# Patient Record
Sex: MALE | URBAN - METROPOLITAN AREA
[De-identification: names, ages, dates, MRNs, and addresses within clinical notes are randomized per-mention and may not be internally consistent; named-entity substitution may affect disease eponyms.]

---

## 2018-02-01 NOTE — PATIENT DISCUSSION
Based on today’s exam, diagnostic studies, and review of records, the determination was made for FOCAL LASER treatment today of the macular edema. Procedure was performed without complications. Instructed to call immediately if any new distortion, blurring, decreased vision or eye pain.

## 2019-06-06 NOTE — PATIENT DISCUSSION
Capsular haze appears visually significant, RECOMMEND CONSULT with DR William Washburn for possible YAG capsulotomy.

## 2019-06-06 NOTE — PROCEDURE NOTE: CLINICAL
PROCEDURE NOTE: Focal Laser, Retina OD. Diagnosis: Diabetic Macular Edema. Anesthesia: Topical. Prior to focal laser, risks/benefits/alternatives to laser discussed including loss of vision and patient wished to proceed. An informed consent was obtained and no assurances or guarantees were given. Spot size: 50 um. Power: 50 mW. Number of pulses: 7. Pulse duration: 50 ms. Procedure Time: 759. Patient tolerated procedure well. There were no complications. Post procedure instructions given. Patient given office phone number/answering service number and advised to call immediately should there be loss of vision or pain, or should they have any other questions or concerns. [facility]

## 2019-08-28 ENCOUNTER — IMPORTED ENCOUNTER (OUTPATIENT)
Dept: URBAN - METROPOLITAN AREA CLINIC 9 | Facility: CLINIC | Age: 29
End: 2019-08-28

## 2020-10-07 NOTE — PATIENT DISCUSSION
Capsular haze appears visually significant, RECOMMEND CONSULT with DR Delta Rice for possible YAG capsulotomy.

## 2020-10-07 NOTE — PROCEDURE NOTE: CLINICAL
PROCEDURE NOTE: Focal Laser, Retina OD. Diagnosis: Diabetic Macular Edema. Anesthesia: Topical. Prior to focal laser, risks/benefits/alternatives to laser discussed including loss of vision and patient wished to proceed. An informed consent was obtained and no assurances or guarantees were given. Spot size: 50 um. Power: 110 mW. Number of pulses: 21. Pulse duration: 50 ms. Procedure Time: 1:18PM. Patient tolerated procedure well. There were no complications. Post procedure instructions given. Patient given office phone number/answering service number and advised to call immediately should there be loss of vision or pain, or should they have any other questions or concerns. Kim Ospina

## 2021-06-09 NOTE — PATIENT DISCUSSION
NOT RED TODAY. Pt's wife is calling. He is not with her at this time.    Daughter took him up north without her permission.  She stated that he has a heart monitor and is not home at this time. She is worried about him, as he is up north with his daughter.  She is concerned about him.    I advised her to contact her daughter, who is with him at this time, and express her concerns with her.    Call back with any further concerns or questions.  She verbalized understanding.    Tasneem Orlando RN   [facility] Nurse Advisor      Reason for Disposition    Nursing judgment or information in reference    Additional Information    Negative: Nursing judgment, per information in Reference    Negative: Information only call about a Well Adult (no illness or injury)    Negative: Nursing judgment or information in reference    Negative: Nursing judgment or information in reference    Negative: Nursing judgment or information in reference    Negative: Nursing judgment or information in reference    Negative: Nursing judgment or information in reference    Negative: Nursing judgment or information in reference    Negative: Nursing judgment or information in reference    Negative: Nursing judgment or information in reference    Negative: Nursing judgment or information in reference    Negative: Nursing judgment or information in reference    Negative: Nursing judgment or information in reference    Negative: Nursing judgment or information in reference    Negative: Nursing judgment or information in reference    Protocols used: NO GUIDELINE [id number]

## 2021-10-16 ASSESSMENT — VISUAL ACUITY
OD_SC: 20/400 SN
OD_CC: 20/20 SN
OS_CC: 20/20 SN
OS_SC: 20/400 SN

## 2021-10-16 ASSESSMENT — KERATOMETRY
OS_K2POWER_DIOPTERS: 41.75
OD_AXISANGLE_DEGREES: 163
OD_K2POWER_DIOPTERS: 42.25
OS_K1POWER_DIOPTERS: 41.75
OD_K1POWER_DIOPTERS: 41.25
OS_AXISANGLE_DEGREES: 180
OS_AXISANGLE2_DEGREES: 90
OD_AXISANGLE2_DEGREES: 73

## 2021-11-29 NOTE — PATIENT DISCUSSION
Capsular haze appears visually significant, RECOMMEND CONSULT with  [facility] for possible YAG capsulotomy.

## 2022-11-14 NOTE — PATIENT DISCUSSION
Capsular haze appears visually significant, RECOMMEND CONSULT with DR Delilah Krishnamurthy for possible YAG capsulotomy.